# Patient Record
Sex: MALE | Race: WHITE | NOT HISPANIC OR LATINO | URBAN - METROPOLITAN AREA
[De-identification: names, ages, dates, MRNs, and addresses within clinical notes are randomized per-mention and may not be internally consistent; named-entity substitution may affect disease eponyms.]

---

## 2020-07-25 ENCOUNTER — TELEPHONE ENCOUNTER (OUTPATIENT)
Dept: URBAN - METROPOLITAN AREA CLINIC 13 | Facility: CLINIC | Age: 65
End: 2020-07-25

## 2020-07-25 RX ORDER — POLYETHYLENE GLYCOL 3350, SODIUM CHLORIDE, SODIUM BICARBONATE AND POTASSIUM CHLORIDE WITH LEMON FLAVOR 420; 11.2; 5.72; 1.48 G/4L; G/4L; G/4L; G/4L
TAKE 1/2 GALLON AT 5:00 PM DAY BEFORE PROCEDURE, TAKE SECOND 1/2 OF GALLON 6 HRS PRIOR TO PROCEDURE POWDER, FOR SOLUTION ORAL
Qty: 1 | Refills: 0 | OUTPATIENT
Start: 2018-07-13 | End: 2018-09-05

## 2020-07-26 ENCOUNTER — TELEPHONE ENCOUNTER (OUTPATIENT)
Dept: URBAN - METROPOLITAN AREA CLINIC 13 | Facility: CLINIC | Age: 65
End: 2020-07-26

## 2020-07-26 RX ORDER — ATORVASTATIN CALCIUM 80 MG/1
TAKE 1 TABLET DAILY TABLET, FILM COATED ORAL
Refills: 0 | Status: ACTIVE | COMMUNITY
Start: 2018-07-13

## 2020-07-26 RX ORDER — PAROXETINE HYDROCHLORIDE HEMIHYDRATE 20 MG/1
TAKE 1 TABLET DAILY TABLET, FILM COATED ORAL
Refills: 0 | Status: ACTIVE | COMMUNITY
Start: 2018-07-13

## 2020-07-26 RX ORDER — CARVEDILOL 6.25 MG/1
TAKE 1/2 TABLET TWICE DAILY,  WITH MORNING AND EVENING MEAL TABLET, FILM COATED ORAL
Refills: 0 | Status: ACTIVE | COMMUNITY
Start: 2018-07-13

## 2020-07-26 RX ORDER — UBIDECARENONE/VIT E ACET 100MG-5
TAKE 1 CAPSULE DAILY CAPSULE ORAL
Refills: 0 | Status: ACTIVE | COMMUNITY
Start: 2018-07-13

## 2020-07-26 RX ORDER — LISINOPRIL 5 MG/1
TAKE 1 TABLET DAILY TABLET ORAL
Refills: 0 | Status: ACTIVE | COMMUNITY
Start: 2018-07-13

## 2023-08-01 ENCOUNTER — OFFICE VISIT (OUTPATIENT)
Dept: URBAN - METROPOLITAN AREA CLINIC 113 | Facility: CLINIC | Age: 68
End: 2023-08-01
Payer: MEDICARE

## 2023-08-01 VITALS
WEIGHT: 134 LBS | HEART RATE: 73 BPM | BODY MASS INDEX: 23.74 KG/M2 | DIASTOLIC BLOOD PRESSURE: 58 MMHG | SYSTOLIC BLOOD PRESSURE: 106 MMHG | HEIGHT: 63 IN | TEMPERATURE: 97.7 F | RESPIRATION RATE: 14 BRPM

## 2023-08-01 DIAGNOSIS — Z80.0 FAMILY HISTORY OF COLORECTAL CANCER: ICD-10-CM

## 2023-08-01 DIAGNOSIS — K64.1 GRADE II INTERNAL HEMORRHOIDS: ICD-10-CM

## 2023-08-01 PROCEDURE — 99203 OFFICE O/P NEW LOW 30 MIN: CPT | Performed by: INTERNAL MEDICINE

## 2023-08-01 PROCEDURE — 46600 DIAGNOSTIC ANOSCOPY SPX: CPT | Performed by: INTERNAL MEDICINE

## 2023-08-01 RX ORDER — UBIDECARENONE/VIT E ACET 100MG-5
TAKE 1 CAPSULE DAILY CAPSULE ORAL
Refills: 0 | Status: ACTIVE | COMMUNITY
Start: 2018-07-13

## 2023-08-01 RX ORDER — PAROXETINE HYDROCHLORIDE HEMIHYDRATE 20 MG/1
TAKE 1 TABLET DAILY TABLET, FILM COATED ORAL
Refills: 0 | Status: ACTIVE | COMMUNITY
Start: 2018-07-13

## 2023-08-01 RX ORDER — CARVEDILOL 3.12 MG/1
1 TAB TABLET, FILM COATED ORAL TWICE DAILY
Refills: 0 | Status: ACTIVE | COMMUNITY
Start: 2018-07-13

## 2023-08-01 RX ORDER — ATORVASTATIN CALCIUM 80 MG/1
TAKE 1 TABLET DAILY TABLET, FILM COATED ORAL
Refills: 0 | Status: ACTIVE | COMMUNITY
Start: 2018-07-13

## 2023-08-01 RX ORDER — POLYETHYLENE GLYCOL 3350, SODIUM SULFATE ANHYDROUS, SODIUM BICARBONATE, SODIUM CHLORIDE, POTASSIUM CHLORIDE 236; 22.74; 6.74; 5.86; 2.97 G/4L; G/4L; G/4L; G/4L; G/4L
AS DIRECTED POWDER, FOR SOLUTION ORAL 1
Qty: 1 | Refills: 0 | OUTPATIENT
Start: 2023-08-17 | End: 2023-08-18

## 2023-08-01 RX ORDER — LISINOPRIL 2.5 MG/1
TAKE 1 TABLET DAILY TABLET ORAL DAILY
Refills: 0 | Status: ACTIVE | COMMUNITY
Start: 2018-07-13

## 2023-08-01 NOTE — HPI-TODAY'S VISIT:
Mr. Santos is a 68-year-old gentleman with a history of coronary artery disease status post MI and mitral valve replacement presenting for evaluation regarding family history (daughter) with colorectal cancer and perianal symptoms.  He reports that his daughter has been diagnosed with stage III colorectal cancer at age 39 and has undergone resection and chemotherapy.  His last colonoscopy on 9/5/2018 was notable for sigmoid colon diverticulosis but otherwise normal exam.  Over the past months he has had increased amounts of perianal irritation and feeling of a "bulge" on one side of his anus.  Denies any significant perianal pain or bleeding.  He has had some perianal itching.  He is used over-the-counter products with some improvement.  He also believes that adherence to a high-fiber diet has been helpful to relieve symptoms.  He denies any abdominal pain.  His bowel habits are fairly regular and normal consistency.  He also denies any nausea, vomiting, heartburn or difficulty swallowing.

## 2023-08-17 ENCOUNTER — DASHBOARD ENCOUNTERS (OUTPATIENT)
Age: 68
End: 2023-08-17

## 2023-08-23 ENCOUNTER — TELEPHONE ENCOUNTER (OUTPATIENT)
Dept: URBAN - METROPOLITAN AREA CLINIC 60 | Facility: CLINIC | Age: 68
End: 2023-08-23

## 2023-08-23 RX ORDER — POLYETHYLENE GLYCOL 3350, SODIUM SULFATE ANHYDROUS, SODIUM BICARBONATE, SODIUM CHLORIDE, POTASSIUM CHLORIDE 236; 22.74; 6.74; 5.86; 2.97 G/4L; G/4L; G/4L; G/4L; G/4L
1/2 PREP (2000ML) @ 5PM POWDER, FOR SOLUTION ORAL
Refills: 1
Start: 2023-08-17 | End: 2023-10-23

## 2023-09-05 ENCOUNTER — LAB OUTSIDE AN ENCOUNTER (OUTPATIENT)
Dept: URBAN - METROPOLITAN AREA CLINIC 113 | Facility: CLINIC | Age: 68
End: 2023-09-05

## 2023-10-08 ENCOUNTER — WEB ENCOUNTER (OUTPATIENT)
Dept: URBAN - METROPOLITAN AREA CLINIC 113 | Facility: CLINIC | Age: 68
End: 2023-10-08

## 2023-11-08 ENCOUNTER — OUT OF OFFICE VISIT (OUTPATIENT)
Dept: URBAN - METROPOLITAN AREA SURGERY CENTER 25 | Facility: SURGERY CENTER | Age: 68
End: 2023-11-08
Payer: MEDICARE

## 2023-11-08 DIAGNOSIS — K58.9 IRRITABLE BOWEL SYNDROME WITHOUT DIARRHEA: ICD-10-CM

## 2023-11-08 DIAGNOSIS — Z12.11 COLON CANCER SCREENING (HIGH RISK): ICD-10-CM

## 2023-11-08 DIAGNOSIS — K57.30 COLON, DIVERTICULOSIS: ICD-10-CM

## 2023-11-08 DIAGNOSIS — K64.0 FIRST DEGREE HEMORRHOIDS: ICD-10-CM

## 2023-11-08 DIAGNOSIS — Z80.0 FAMILY HISTORY OF COLORECTAL CANCER: ICD-10-CM

## 2023-11-08 DIAGNOSIS — Z12.11 COLON CANCER SCREENING: ICD-10-CM

## 2023-11-08 PROCEDURE — 00811 ANES LWR INTST NDSC NOS: CPT | Performed by: ANESTHESIOLOGY

## 2023-11-08 PROCEDURE — G0105 COLORECTAL SCRN; HI RISK IND: HCPCS | Performed by: INTERNAL MEDICINE

## 2023-11-08 PROCEDURE — G8907 PT DOC NO EVENTS ON DISCHARG: HCPCS | Performed by: CLINIC/CENTER

## 2023-11-08 PROCEDURE — 00811 ANES LWR INTST NDSC NOS: CPT | Performed by: ANESTHESIOLOGIST ASSISTANT

## 2023-11-08 PROCEDURE — G0105 COLORECTAL SCRN; HI RISK IND: HCPCS | Performed by: CLINIC/CENTER

## 2023-11-08 RX ORDER — LISINOPRIL 2.5 MG/1
TAKE 1 TABLET DAILY TABLET ORAL DAILY
Refills: 0 | Status: ACTIVE | COMMUNITY
Start: 2018-07-13

## 2023-11-08 RX ORDER — CARVEDILOL 3.12 MG/1
1 TAB TABLET, FILM COATED ORAL TWICE DAILY
Refills: 0 | Status: ACTIVE | COMMUNITY
Start: 2018-07-13

## 2023-11-08 RX ORDER — ATORVASTATIN CALCIUM 80 MG/1
TAKE 1 TABLET DAILY TABLET, FILM COATED ORAL
Refills: 0 | Status: ACTIVE | COMMUNITY
Start: 2018-07-13

## 2023-11-08 RX ORDER — PAROXETINE HYDROCHLORIDE HEMIHYDRATE 20 MG/1
TAKE 1 TABLET DAILY TABLET, FILM COATED ORAL
Refills: 0 | Status: ACTIVE | COMMUNITY
Start: 2018-07-13

## 2023-11-08 RX ORDER — UBIDECARENONE/VIT E ACET 100MG-5
TAKE 1 CAPSULE DAILY CAPSULE ORAL
Refills: 0 | Status: ACTIVE | COMMUNITY
Start: 2018-07-13

## 2023-12-04 ENCOUNTER — OFFICE VISIT (OUTPATIENT)
Dept: URBAN - METROPOLITAN AREA CLINIC 113 | Facility: CLINIC | Age: 68
End: 2023-12-04

## 2025-06-06 ENCOUNTER — OFFICE VISIT (OUTPATIENT)
Dept: URBAN - METROPOLITAN AREA CLINIC 113 | Facility: CLINIC | Age: 70
End: 2025-06-06
Payer: MEDICARE

## 2025-06-06 ENCOUNTER — LAB OUTSIDE AN ENCOUNTER (OUTPATIENT)
Dept: URBAN - METROPOLITAN AREA CLINIC 113 | Facility: CLINIC | Age: 70
End: 2025-06-06

## 2025-06-06 DIAGNOSIS — R10.13 EPIGASTRIC PAIN: ICD-10-CM

## 2025-06-06 DIAGNOSIS — K59.09 INTERMITTENT CONSTIPATION: ICD-10-CM

## 2025-06-06 PROCEDURE — 99214 OFFICE O/P EST MOD 30 MIN: CPT

## 2025-06-06 RX ORDER — ATORVASTATIN CALCIUM 80 MG/1
TAKE 1 TABLET DAILY TABLET, FILM COATED ORAL
Refills: 0 | Status: ACTIVE | COMMUNITY
Start: 2018-07-13

## 2025-06-06 RX ORDER — PAROXETINE HYDROCHLORIDE HEMIHYDRATE 20 MG/1
TAKE 1 TABLET DAILY TABLET, FILM COATED ORAL
Refills: 0 | Status: ACTIVE | COMMUNITY
Start: 2018-07-13

## 2025-06-06 RX ORDER — ONDANSETRON 4 MG/1
1 TABLET ON THE TONGUE AND ALLOW TO DISSOLVE TABLET, ORALLY DISINTEGRATING ORAL ONCE A DAY
Qty: 30 | OUTPATIENT
Start: 2025-06-06

## 2025-06-06 RX ORDER — CARVEDILOL 3.12 MG/1
1 TAB TABLET, FILM COATED ORAL TWICE DAILY
Refills: 0 | Status: ACTIVE | COMMUNITY
Start: 2018-07-13

## 2025-06-06 RX ORDER — LISINOPRIL 2.5 MG/1
TAKE 1 TABLET DAILY TABLET ORAL DAILY
Refills: 0 | Status: ACTIVE | COMMUNITY
Start: 2018-07-13

## 2025-06-06 RX ORDER — UBIDECARENONE/VIT E ACET 100MG-5
TAKE 1 CAPSULE DAILY CAPSULE ORAL
Refills: 0 | Status: ON HOLD | COMMUNITY
Start: 2018-07-13

## 2025-06-06 RX ORDER — PANTOPRAZOLE SODIUM 40 MG/1
1 TABLET 1/2 TO 1 HOUR BEFORE MORNING MEAL TABLET, DELAYED RELEASE ORAL ONCE A DAY
Qty: 30 | OUTPATIENT
Start: 2025-06-06

## 2025-06-06 RX ORDER — EZETIMIBE 10 MG/1
TABLET ORAL
Qty: 90 TABLET | Status: ACTIVE | COMMUNITY

## 2025-06-06 NOTE — HPI-OTHER HISTORIES
Colonoscopy (11/8/2023) BBPS 8, diverticulosis in the sigmoid colon, significant spasm. Nonbleeding, hemorrhage, grade 1. Colonoscopy in 5 years for screening purposes.

## 2025-06-06 NOTE — HPI-TODAY'S VISIT:
Mr. Stoddard is a 70-year-old gentleman presenting for follow-up after recent ER visit.  He was seen at [facility] on 6/3/2025 with complaints of epigastric pain x 2 days.  He had a CT abdomen pelvis which showed no acute abnormality in the abdomen or pelvis.  Nonobstructing 3 mm calculus in the right kidney.  Minimal prostate megaly and coronary artery calcifications.  Labs within normal hemoglobin and white blood cell count, total bilirubin 1.3   He reports he usually very regular, he has not had a bowel movement for the last 3 days. Denies hematochezia or melena. He is taking opioid pain medication prescribed by the ER. This has helped the pain. He denies nausea, vomiting, or fevers. Other than 81 mg Aspirin, Denies NSAID use. His weight is down some but he reports this was intentional, appetite intact. Denies early satiety.

## 2025-06-09 ENCOUNTER — WEB ENCOUNTER (OUTPATIENT)
Dept: URBAN - METROPOLITAN AREA CLINIC 113 | Facility: CLINIC | Age: 70
End: 2025-06-09

## 2025-07-11 ENCOUNTER — ERX REFILL RESPONSE (OUTPATIENT)
Dept: URBAN - METROPOLITAN AREA CLINIC 113 | Facility: CLINIC | Age: 70
End: 2025-07-11

## 2025-07-11 RX ORDER — PANTOPRAZOLE SODIUM 40 MG/1
1 TABLET 1/2 TO 1 HOUR BEFORE MORNING MEAL ONCE A DAY TABLET, DELAYED RELEASE ORAL
Qty: 90 TABLET | Refills: 1 | OUTPATIENT

## 2025-07-11 RX ORDER — PANTOPRAZOLE SODIUM 40 MG/1
1 TABLET 1/2 TO 1 HOUR BEFORE MORNING MEAL TABLET, DELAYED RELEASE ORAL ONCE A DAY
Qty: 30 | OUTPATIENT

## 2025-07-15 ENCOUNTER — CLAIMS CREATED FROM THE CLAIM WINDOW (OUTPATIENT)
Dept: URBAN - METROPOLITAN AREA SURGERY CENTER 25 | Facility: SURGERY CENTER | Age: 70
End: 2025-07-15
Payer: MEDICARE

## 2025-07-15 ENCOUNTER — TELEPHONE ENCOUNTER (OUTPATIENT)
Dept: URBAN - METROPOLITAN AREA CLINIC 113 | Facility: CLINIC | Age: 70
End: 2025-07-15

## 2025-07-15 ENCOUNTER — CLAIMS CREATED FROM THE CLAIM WINDOW (OUTPATIENT)
Dept: URBAN - METROPOLITAN AREA CLINIC 4 | Facility: CLINIC | Age: 70
End: 2025-07-15
Payer: MEDICARE

## 2025-07-15 DIAGNOSIS — K31.89 REACTIVE GASTROPATHY: ICD-10-CM

## 2025-07-15 DIAGNOSIS — R10.13 EPIGASTRIC ABDOMINAL PAIN: ICD-10-CM

## 2025-07-15 DIAGNOSIS — K29.70 GASTRITIS, UNSPECIFIED, WITHOUT BLEEDING: ICD-10-CM

## 2025-07-15 PROCEDURE — 43239 EGD BIOPSY SINGLE/MULTIPLE: CPT | Performed by: CLINIC/CENTER

## 2025-07-15 PROCEDURE — 88305 TISSUE EXAM BY PATHOLOGIST: CPT | Performed by: PATHOLOGY

## 2025-07-15 PROCEDURE — 43239 EGD BIOPSY SINGLE/MULTIPLE: CPT | Performed by: INTERNAL MEDICINE

## 2025-07-15 RX ORDER — ONDANSETRON 4 MG/1
1 TABLET ON THE TONGUE AND ALLOW TO DISSOLVE TABLET, ORALLY DISINTEGRATING ORAL ONCE A DAY
Qty: 30 | Status: ACTIVE | COMMUNITY
Start: 2025-06-06

## 2025-07-15 RX ORDER — PANTOPRAZOLE SODIUM 40 MG/1
1 TABLET 1/2 TO 1 HOUR BEFORE MORNING MEAL ONCE A DAY TABLET, DELAYED RELEASE ORAL
Qty: 90 TABLET | Refills: 1 | Status: ACTIVE | COMMUNITY

## 2025-07-15 RX ORDER — ATORVASTATIN CALCIUM 80 MG/1
TAKE 1 TABLET DAILY TABLET, FILM COATED ORAL
Refills: 0 | Status: ACTIVE | COMMUNITY
Start: 2018-07-13

## 2025-07-15 RX ORDER — UBIDECARENONE/VIT E ACET 100MG-5
TAKE 1 CAPSULE DAILY CAPSULE ORAL
Refills: 0 | Status: ON HOLD | COMMUNITY
Start: 2018-07-13

## 2025-07-15 RX ORDER — PAROXETINE HYDROCHLORIDE HEMIHYDRATE 20 MG/1
TAKE 1 TABLET DAILY TABLET, FILM COATED ORAL
Refills: 0 | Status: ACTIVE | COMMUNITY
Start: 2018-07-13

## 2025-07-15 RX ORDER — EZETIMIBE 10 MG/1
TABLET ORAL
Qty: 90 TABLET | Status: ACTIVE | COMMUNITY

## 2025-07-15 RX ORDER — LISINOPRIL 2.5 MG/1
TAKE 1 TABLET DAILY TABLET ORAL DAILY
Refills: 0 | Status: ACTIVE | COMMUNITY
Start: 2018-07-13

## 2025-07-15 RX ORDER — CARVEDILOL 3.12 MG/1
1 TAB TABLET, FILM COATED ORAL TWICE DAILY
Refills: 0 | Status: ACTIVE | COMMUNITY
Start: 2018-07-13

## 2025-08-15 ENCOUNTER — OFFICE VISIT (OUTPATIENT)
Dept: URBAN - METROPOLITAN AREA CLINIC 113 | Facility: CLINIC | Age: 70
End: 2025-08-15
Payer: MEDICARE

## 2025-08-15 DIAGNOSIS — K59.09 INTERMITTENT CONSTIPATION: ICD-10-CM

## 2025-08-15 DIAGNOSIS — R10.13 EPIGASTRIC PAIN: ICD-10-CM

## 2025-08-15 PROCEDURE — 99212 OFFICE O/P EST SF 10 MIN: CPT

## 2025-08-15 RX ORDER — CARVEDILOL 3.12 MG/1
1 TAB TABLET, FILM COATED ORAL TWICE DAILY
Refills: 0 | Status: ACTIVE | COMMUNITY
Start: 2018-07-13

## 2025-08-15 RX ORDER — LISINOPRIL 2.5 MG/1
TAKE 1 TABLET DAILY TABLET ORAL DAILY
Refills: 0 | Status: ACTIVE | COMMUNITY
Start: 2018-07-13

## 2025-08-15 RX ORDER — EZETIMIBE 10 MG/1
TABLET ORAL
Qty: 90 TABLET | Status: ACTIVE | COMMUNITY

## 2025-08-15 RX ORDER — ONDANSETRON 4 MG/1
1 TABLET ON THE TONGUE AND ALLOW TO DISSOLVE TABLET, ORALLY DISINTEGRATING ORAL ONCE A DAY
Qty: 30 | Status: ACTIVE | COMMUNITY
Start: 2025-06-06

## 2025-08-15 RX ORDER — PAROXETINE HYDROCHLORIDE HEMIHYDRATE 20 MG/1
TAKE 1 TABLET DAILY TABLET, FILM COATED ORAL
Refills: 0 | Status: ACTIVE | COMMUNITY
Start: 2018-07-13

## 2025-08-15 RX ORDER — ATORVASTATIN CALCIUM 80 MG/1
TAKE 1 TABLET DAILY TABLET, FILM COATED ORAL
Refills: 0 | Status: ACTIVE | COMMUNITY
Start: 2018-07-13